# Patient Record
Sex: FEMALE | Race: BLACK OR AFRICAN AMERICAN | Employment: OTHER | URBAN - METROPOLITAN AREA
[De-identification: names, ages, dates, MRNs, and addresses within clinical notes are randomized per-mention and may not be internally consistent; named-entity substitution may affect disease eponyms.]

---

## 2018-11-17 PROBLEM — I47.9 PAROXYSMAL TACHYCARDIA (HCC): Status: ACTIVE | Noted: 2018-11-17

## 2018-11-17 PROBLEM — R60.0 EDEMA OF BOTH LEGS: Status: ACTIVE | Noted: 2018-11-17

## 2018-11-17 PROBLEM — M17.10 PRIMARY OSTEOARTHRITIS OF KNEE: Status: ACTIVE | Noted: 2018-11-17

## 2021-03-04 PROBLEM — R06.09 DYSPNEA ON EXERTION: Status: ACTIVE | Noted: 2021-03-04

## 2022-11-07 NOTE — PROGRESS NOTES
Provided condition update for Dr. Delon Stokes TORB pelvic ultrasound for PMB, results to be sent to Dr. Arsh Amato. Called pt after appointment and notified of new orders. She verbalizes understanding and agreeable to plan.  Provided central scheduling phone #

## 2022-11-07 NOTE — PATIENT INSTRUCTIONS
[facility] UROGYNECOLOGY  Tamiarush Morgan [address]  [place] [address] [address]: [phone]  FAX: [phone]       Urodynamic Testing Discharge Instructions: There are NO dietary or activity restrictions. You may resume your normal schedule. You may have mild discomfort for a few hours after your testing today. There may be some mild burning when you urinate or you may see some blood in your urine. These problems should not last more than 24 hours. The following suggestions may minimize any symptoms you experience. Drink 6-8 large glasses of water over the next 8 hours  A compress or sitz bath may be soothing  Tylenol or Ibuprofen may be taken as needed    If you experience any of the following, please call the office or, if after hours, the on-call physician at [phone]. Excessive pain  Bright red bloody discharge  Fever or chills  Continued urgency, frequency or burning with urination    Obtaining Test Results    Your urodynamic test will be interpreted by a specialist and available to the referring physician within 7-14 days. Patients in our clinic are given an appointment to come back to discuss the results and any appropriate treatment recommendations. Please do not hesitate to contact our office with any questions or concerns at [phone]. I acknowledge that I have received verbal and written discharge  instructions and that I understand these instructions clearly.     Patient Signature:    Date:

## 2022-11-07 NOTE — PROCEDURES
Patient here for urodynamic testing. Procedure explained and confirmed by patient. See evaluation form for results. Both verbal and written discharge instructions were given. Patient tolerated procedure well and will follow up with Dr. Javad Valdes on 22 per phone. URODYNAMIC EVALUATION    PATIENT HISTORY:    Prolapse:  Yes  ANAM:  No  UUI:  Yes, daily  Nocturia:  2  Frequency:  every 2-3 hours  Incomplete Emptying:  Yes, perceived at times  Constipation:  No  Last void prior to UDS testinmin  Current urge to void? Moderate  OAB meds stopped prior to test?  NA  Other symptoms? Pt reports 3 episodes of  vaginal spotting since last visit in 2022, pt unsure if coming from prolapse. She is postmenopausal.      Surgery? [x]  No, interested  []  Yes, specify date:      PATIENT DIAGNOSIS:  Urge Incontinence N39.41,  Uterovaginal Prolapse N81.2 (incomplete)    UDS FINDINGS:   Stress Incontinence N39.3, Uterovaginal Prolapse N81.2 (incomplete) and Detrusor Instability N31.9      MEDICATION: vitamin E 100 UNITS Oral Cap, Take 100 Units by mouth in the morning., Disp: , Rfl:   cholecalciferol 25 MCG (1000 UT) Oral Tab, Take 1,000 Units by mouth in the morning., Disp: , Rfl:   Calcium Carbonate (CALCIUM 600) 600 MG Oral Tab, Take 600 mg by mouth., Disp: , Rfl:   FUROSEMIDE 20 MG Oral Tab, TAKE 2 TABLETS BY MOUTH EVERY MORNING AND 1 TAB EVERY AFTERNOON, Disp: 270 tablet, Rfl: 0  Potassium Chloride ER 20 MEQ Oral Tab CR, Take 20 mEq by mouth daily. , Disp: 90 tablet, Rfl: 0  metoprolol succinate ER 50 MG Oral Tablet 24 Hr, Take 1 tablet (50 mg total) by mouth daily.  (Patient taking differently: Take 100 mg by mouth in the morning.), Disp: 180 tablet, Rfl: 0  OMEPRAZOLE 40 MG Oral Capsule Delayed Release, TAKE 1 CAPSULE BY MOUTH EVERY DAY, Disp: 90 capsule, Rfl: 0  ferrous sulfate 325 (65 FE) MG Oral Tab EC, Take 325 mg by mouth daily with breakfast., Disp: , Rfl:          ALLERGIES:  Patient has no known allergies. EXAM:  Urinalysis Dip:  Today's Results   Component Date Value    control run 11/07/2022 Yes     Blood Urine 11/07/2022 Negative     Nitrite Urine 11/07/2022 Negative     Leukocyte esterase urine 11/07/2022 Trace       Urovesico Junction ( >30 degrees ):  [x]  Mobile  []  Fixed    Perineal Sensation:  [x]  Normal  []  Abnormal    Additional Notes:    PROLAPSE:  [x]  Yes  []  No  Prolapse reduced for testing? [x]  Yes  []  No  []  Pessary  [x]  Speculum  []  Proctoswab  []  Vag Packing    Additional Notes:    UROFLOWMETRY: UNREDUCED    Voided Volume:                        74     mL  Maximum Flow Rate:                             19   mL/sec  Average flow rate:                         7    mL/sec  Post-void Residual:                        5   mL  Pattern:  [x]  Normal  []  Poor flow     []  Intermittent  []  Other  Void:   [x]  Typical  []  Atypical    Additional Notes:    CYSTOMETRY:  Urethral Catheter:  Fr 7 / tdoc  Abd Catheter:     Fr 7 / tdoc   Infusion:  Water Rate 30 mL/min reduced to 10mL/min with onset of DO  Temp:  Room  Position:  [x]  Sit  []  Stand  []  Supine  First sensation:   107 mL  First desire to void:   180 mL  Strong desire to void:  257 mL  Maximum cystometric capacity:   292 mL  Detrusor Activity:  [x]  Unstable   []  Stable  Urge leakage? []  Yes [x]  No  Volume at 1st unhibited detrusor cont:   248 mL  Detrusor instability provoked by:    []  Spontaneous []  Coughing  [x]  Filling  []  Valsalva  []  Other    Additional Notes:      URETHRAL FUNCTION:  Valsava (vesical) Leak Point Pressures:    Volume Leak Point Pressure Leak? Cough Valsalva      100mL 188  98   cm H2O []  Yes [x]  No         REDUCED:1/2 SPECULUM  Volume Leak Point Pressure Leak? Cough Valsalva      100mL 196  82   cm H2O []  Yes [x]  No   200mL 213   77 cm H2O []  Yes [x]  No   258mL 194  89   cm H2O [x]  Yes []  No       Genuine Stress Incontinence demonstrated?    [x]  Yes @ 258 mL, reduced in absence of DO []  No    Resting Urethral Pressure Profile:     Functional Urethral Length:    0.7      cm         0.6        cm     Maximum UCP:          56 cm          46   cm       PRESSURE/FLOW STUDY: UNREDUCED  Voided volume:   305     mL  Maximum flow rate:       23 mL/sec  Pressure Detrusor (at maximum flow):         16   cm H2O  Post void residual:      16         mL  Voiding mechanism:  [x]  Abnormal  []  Normal  []  Strain to void   []  Weak detrusor  Void:   [x]  Typical   []  Atypical    Additional Notes: Intermittent rise in PDET for a prolonged void. Pt reports this is typical void for her at home-reports intermittent voiding pattern.   EMG:  [x]  Reactive []  Non-Reactive    11/7/2022 12:39 PM     PERFORMED BY:  Madelin Puga RN        URODYNAMIC PHYSICIAN INTERPRETATION    IMPRESSION:   74/5cc & 305/16cc  MCFP 292cc  DO @ 248cc  ANAM @ 258 mL, reduced in absence of DO       Post-Procedure Diagnoses: Detrusor instability [N31.9] and Stress urinary incontinence [N39.3]    Comments:      Shayla Lange DO   11/7/2022   3:02 PM

## 2023-02-01 ENCOUNTER — EKG ENCOUNTER (OUTPATIENT)
Dept: LAB | Age: 73
End: 2023-02-01
Attending: INTERNAL MEDICINE
Payer: MEDICARE

## 2023-02-01 ENCOUNTER — LAB ENCOUNTER (OUTPATIENT)
Dept: LAB | Age: 73
End: 2023-02-01
Attending: INTERNAL MEDICINE
Payer: MEDICARE

## 2023-02-01 DIAGNOSIS — Z01.818 PRE-OP EVALUATION: ICD-10-CM

## 2023-02-01 LAB
ANION GAP SERPL CALC-SCNC: 7 MMOL/L (ref 0–18)
ATRIAL RATE: 86 BPM
BASOPHILS # BLD AUTO: 0.09 X10(3) UL (ref 0–0.2)
BASOPHILS NFR BLD AUTO: 1 %
BUN BLD-MCNC: 9 MG/DL (ref 7–18)
CALCIUM BLD-MCNC: 9 MG/DL (ref 8.5–10.1)
CHLORIDE SERPL-SCNC: 98 MMOL/L (ref 98–112)
CO2 SERPL-SCNC: 34 MMOL/L (ref 21–32)
CREAT BLD-MCNC: 0.82 MG/DL
EOSINOPHIL # BLD AUTO: 0.08 X10(3) UL (ref 0–0.7)
EOSINOPHIL NFR BLD AUTO: 0.9 %
ERYTHROCYTE [DISTWIDTH] IN BLOOD BY AUTOMATED COUNT: 14.3 %
FASTING STATUS PATIENT QL REPORTED: YES
GFR SERPLBLD BASED ON 1.73 SQ M-ARVRAT: 76 ML/MIN/1.73M2 (ref 60–?)
GLUCOSE BLD-MCNC: 129 MG/DL (ref 70–99)
HCT VFR BLD AUTO: 44.7 %
HGB BLD-MCNC: 14.5 G/DL
IMM GRANULOCYTES # BLD AUTO: 0.03 X10(3) UL (ref 0–1)
IMM GRANULOCYTES NFR BLD: 0.3 %
LYMPHOCYTES # BLD AUTO: 1.82 X10(3) UL (ref 1–4)
LYMPHOCYTES NFR BLD AUTO: 21.2 %
MCH RBC QN AUTO: 30.1 PG (ref 26–34)
MCHC RBC AUTO-ENTMCNC: 32.4 G/DL (ref 31–37)
MCV RBC AUTO: 92.7 FL
MONOCYTES # BLD AUTO: 0.75 X10(3) UL (ref 0.1–1)
MONOCYTES NFR BLD AUTO: 8.7 %
NEUTROPHILS # BLD AUTO: 5.83 X10 (3) UL (ref 1.5–7.7)
NEUTROPHILS # BLD AUTO: 5.83 X10(3) UL (ref 1.5–7.7)
NEUTROPHILS NFR BLD AUTO: 67.9 %
OSMOLALITY SERPL CALC.SUM OF ELEC: 288 MOSM/KG (ref 275–295)
P AXIS: 14 DEGREES
P-R INTERVAL: 124 MS
PLATELET # BLD AUTO: 198 10(3)UL (ref 150–450)
POTASSIUM SERPL-SCNC: 3 MMOL/L (ref 3.5–5.1)
Q-T INTERVAL: 372 MS
QRS DURATION: 88 MS
QTC CALCULATION (BEZET): 445 MS
R AXIS: 82 DEGREES
RBC # BLD AUTO: 4.82 X10(6)UL
SODIUM SERPL-SCNC: 139 MMOL/L (ref 136–145)
T AXIS: 52 DEGREES
VENTRICULAR RATE: 86 BPM
WBC # BLD AUTO: 8.6 X10(3) UL (ref 4–11)

## 2023-02-01 PROCEDURE — 80048 BASIC METABOLIC PNL TOTAL CA: CPT

## 2023-02-01 PROCEDURE — 93005 ELECTROCARDIOGRAM TRACING: CPT

## 2023-02-01 PROCEDURE — 93010 ELECTROCARDIOGRAM REPORT: CPT | Performed by: INTERNAL MEDICINE

## 2023-02-01 PROCEDURE — 85025 COMPLETE CBC W/AUTO DIFF WBC: CPT

## 2023-02-01 PROCEDURE — 36415 COLL VENOUS BLD VENIPUNCTURE: CPT

## 2023-02-10 NOTE — PAT NURSING NOTE
Called ,pcp office and left message for  answering service requesting doctor call regarding medical clearance for surgery

## 2023-02-10 NOTE — PAT NURSING NOTE
Kofi Stinson returned my call and I advised of need for medical clearance. She stated she will review and will send a note later today

## 2023-02-13 NOTE — BRIEF OP NOTE
Pre-Operative Diagnosis: UTEROVAGINAL PROLAPSE, STRESS URINARY INCONTINENCE     Post-Operative Diagnosis: UTEROVAGINAL PROLAPSE, STRESS URINARY INCONTINENCE, fibroid uterus      Procedure Performed:   TOTAL VAGINAL HYSTERECTOMY WITH BILATERAL SALPINGECTOMY (NEREYDA)UTEROSACRAL VAGINAL VAULT SUSPENSION, ANTERIOR/POSTERIOR/ENTEROCELE REPAIR, PLACEMENT OF MID-URETHRAL SLING, CYSTOSCOPY (Vince Vogt)    Surgeon(s) and Role:  Panel 1:     * Axel Aguilar MD - Primary  Panel 2:     * Isael Paulino DO - Primary    Assistant(s):        Surgical Findings: see dictated note.       Specimen: uterus, bilat fallopian tubes     Estimated Blood Loss: Blood Output: 100 mL (2/13/2023  2:26 PM)      Dictation Number:      Shelia Corral MD  2/13/2023  2:58 PM

## 2023-02-13 NOTE — H&P
66 y/o female with uterovaginal prolapse and stress urinary incontinence for surgical mgmt  Wants surgery  Bowel dysfunction  May want home health  Doesn't want vaginal mesh    Pre operative clearance with Dr Janes Phoenix, pt seen & examined without changes. Thorough discussion of surgical risks, benefits, and alternatives including, but not limited to bleeding/clots, infection, injury to nearby organs (urethra, bladder, ureters, bowel, blood vessels), mesh erosion, dyspareunia, de paola UI, worsening UI, recurrence, voiding dysfunction, and pain. Discussed pain mgmt and potential need for narcotics. Discussed addiction potential with narcotics. IL  reviewed. Plan to proceed with [facility] poss BSO (Dr Alem Skinner), [facility] APE repair, MUS, cysto (+cath) as consented  All questions answered.    Marcelo Moss  [phone]

## 2023-02-13 NOTE — OPERATIVE REPORT
[address]    PATIENT'S NAME: JANETH Davis   ATTENDING PHYSICIAN: Blanca Brooks M.D. OPERATING PHYSICIAN: Blanca Brooks M.D. PATIENT ACCOUNT#:   [de-identified]    LOCATION:  [facility] OR [facility] 10  MEDICAL RECORD #:   [id number]       YOB: 1950  ADMISSION DATE:       02/13/2023      OPERATION DATE:  02/13/2023    OPERATIVE REPORT      PREOPERATIVE DIAGNOSIS:  Uterovaginal prolapse and urinary stress incontinence. POSTOPERATIVE DIAGNOSIS:  Uterovaginal prolapse and urinary stress incontinence. PROCEDURE:  Total laparoscopic hysterectomy and bilateral salpingectomy performed by myself with Dr. Ladonna Martinez as an assistant; uterosacral suspension, anterior-posterior repair, cystoscopy and TVT performed by Dr. Yvette Rosenberg with myself as an assistant. ANESTHESIA:  General endotracheal.    ESTIMATED BLOOD LOSS:  100 mL. DRAINS:  Huizar to gravity. COMPLICATIONS:  None. DISPOSITION:  Patient taken to the recovery room in good condition. FINDINGS:  Retroverted uterus. Right-sided broad ligament myoma. Normal bilateral fallopian tubes. Normal ovaries but very high in the pelvic cavity. OPERATIVE TECHNIQUE:  After assuring informed consent, the patient was taken to the operating room, given general anesthesia, placed in dorsal lithotomy position, prepped and draped in usual sterile fashion. A weighted speculum was placed in the posterior vagina, and the cervix was visualized, grasped with 2 single-tooth tenacula, and cervix was brought through the introitus. The vaginal epithelium was infiltrated with Marcaine with epinephrine in a circumferential fashion around the entire cervix, and the vaginal epithelium was circumferentially incised around the cervix. Posterior peritoneum was identified and entered with Kim scissors. Posterior retractor was placed inside the pelvis.   The uterosacral ligaments were identified bilaterally, clamped with a curved Cholo clamp, cut and transected and suture ligated with 0 Vicryl. Cardinal ligaments bilaterally were clamped with curved Heaneys, resected and suture ligated. The anterior peritoneum was dissected away. The bladder pillars and uterine arteries and vein were clamped with curved Cholo and transected. Peritoneum was then entered fully. The broad ligament was clamped and cut and suture ligated. The utero-ovarian ligaments were clamped, cut, and suture ligated bilaterally. Uterus was removed from the operative field. The fallopian tubes bilaterally were inspected and noted to be normal.  The ovaries were also noted to be normal but located flush against the pelvic sidewall up high in the pelvis. A decision was made because they were completely normal and it would be difficult with increased risk of injury to try to remove them that we would leave them in place. The fallopian tubes, however, were accessible. They were grabbed with a Lillette Lolling, brought onto the operative field, clamped with a curved 6, resected out with Metzenbaum. The pedicles were tied with a free tie of 0 Vicryl. This procedure was completed on both sides without complication. This then ended my part of the operative procedure. Dr. Rosendo Milner will finish dictating the remainder of the case.     Dictated By Verner Edu, M.D.  d: 02/13/2023 15:06:39  t: 02/13/2023 16:42:11  [facility] [id number]  JOSEPH/

## 2023-02-13 NOTE — DISCHARGE INSTRUCTIONS
[facility] FOR PELVIC MEDICINE     Post-Operative Guidelines      AVOID CONSTIPATION - Take Miralax: one capful in water or juice each morning. You can also take each evening if needed. Use milk of magnesia daily as needed to avoid straining with BMs  No lifting over 10 lbs. (1 gallon of milk is 8 lbs. )  It is okay to walk up and down stairs and to walk outside, but be careful not to become fatigued. Showers and tub baths are okay, even if you have a catheter or abdominal incision. You may ride in a car immediately. You may drive after 1-2 weeks. Please call us for any of the following:  Temperature above 100.5 for 4 hours or above 101.0 at any time  Chest pain or trouble breathing  Vaginal bleeding heavier than a period  Redness, tenderness or swelling of your legs  Pain or burning when you urinate  Redness, pain or foul discharge from incision    Office telephone, [phone], after hours [phone]    Sometimes managing your health at home requires assistance. The [facility] team has recognized your preference to use [facility]. They can be reached by phone at [phone]. The fax number for your reference is [phone]. A representative from the home health agency will contact you or your family to schedule your first visit.

## 2023-02-13 NOTE — ANESTHESIA PROCEDURE NOTES
Airway  Date/Time: 2/13/2023 1:23 PM  Urgency: elective      General Information and Staff    Patient location during procedure: OR  Anesthesiologist: Caridad Vargas MD  Performed: anesthesiologist     Indications and Patient Condition  Indications for airway management: anesthesia  Sedation level: deep  Preoxygenated: yes  Patient position: sniffing  Mask difficulty assessment: 1 - vent by mask    Final Airway Details  Final airway type: endotracheal airway      Successful airway: ETT  Cuffed: yes   Successful intubation technique: direct laryngoscopy  Facilitating devices/methods: intubating stylet  Endotracheal tube insertion site: oral  Blade: Sean  Blade size: #3  ETT size (mm): 7.0    Cormack-Lehane Classification: grade I - full view of glottis  Placement verified by: chest auscultation and capnometry   Cuff volume (mL): 7  Measured from: lips  ETT to lips (cm): 21  Number of attempts at approach: 1  Ventilation between attempts: none  Number of other approaches attempted: 0

## 2023-02-13 NOTE — OPERATIVE REPORT
PRE-OP DIAGNOSIS:  Uterovaginal prolapse; stress incontinence    POST-OP DIAGNOSIS:  Same, fibroid uterus    PROCEDURE:  Repair of enterocele; uterosacral ligament suspension; anterior colporrhaphy; posterior colporrhaphy; midurethral sling; cystourethroscopy    SURGEON:  Ladonna Martinez DO    ASSISTANT:  Pedro Liang    ANESTHESIA:  General  No specimen for this portion    EBL:  100 ml  UOP: 200 mL    Findings: bilateral ureteral efflux without evidence of bladder, ureteral, or urethral injury. Hemostasis. DESCRIPTION OF PROCEDURE:   The patient remained in dorsal lithotomy position prepped and draped under general anesthesia after Dr. Pedro Liang completed the vaginal hysterectomy. Due to the presence of significant apical prolapse and associated enterocele, it was necessary to proceed with suspension of the vaginal apex and enterocele repair. A lap sponge was placed into the peritoneal cavity and the uterosacral ligaments identified. A Cowan stitch was placed and tagged to repair the enterocele. This suture was placed through the posterior vaginal wall, through the left uterosacral ligament, across the anterior rectum, through the right uterosacral ligament and then back through the posterior vaginal wall. Two uterosacral suspension stitches were then placed on each side of the pelvis. These were placed high on the uterosacral ligaments at and just proximal to the ischial spine. Uterosacral suspension stitches were then placed using 1-0 vicryl sutures on each side of the pelvis for apical suspension. Once these sutures were placed, the urethra was dilated to accommodate a 23 Angu[place]an caliber cystoscope sheath and cystoscopy was undertaken. Cystoscopy confirmed that there had been no injury to the bladder. Urine was seen from both ureteral orifices confirming that the ureters were patent.      Allis clamps were used to grasp the redundant anterior vaginal epithelium in the midline and a midline incision was made after infiltration of .25% marcaine with epinepherine. The redundant epithelium was dissected from the underlying endopelvic connective tissue of the bladder and 0 Vicryl suture was then used to plicate the endopelvic connective tissue, obliterating the cystocele. The excess vaginal epithelium was excised and the midline incision was then closed with 2-0 Vicryl suture in running locking fashion. The lap sponge was removed from the peritoneal cavity and the vaginal apex was then closed with 2-0 Vicryl suture in a running locking fashion. The uterosacral sutures were then tied down resulting in satisfactory elevation of the vaginal apex. Allis clamps were then used to grasp the vaginal epithelium suburethrally and a scalpel was used to make the midline incision after infiltration of .25% marcaine with epinepherine. Sharp dissection with Metzenbaum scissors was performed to dissect the periurethral tissues towards the pubic rami bilaterally. The bladder was emptied, the Advantage Fit trocars were then used to place the guide sleeves into the retropubic space. Once the sleeves were in position, cystoscopy was undertaken. No bladder, urethral, or ureteral injuries identified. Urine seen from both UOs. The sleeves were then brought through the suprapubic skin, bringing the mesh into position at the level of the midurethra. Care was taken to avoid placing tension on the urethra as the covering sheath was removed from the mesh. The excess mesh was cut away at the suprapubic skin and those incisions closed with skin glue. The vaginal incision was then closed with 2-0 Vicryl suture in a running locking fashion. Attention was then directed to the posterior compartment. The redundant posterior vaginal epithelium and perineal skin was excised after infiltration of .25% marcaine with epinepherine. 0 Vicryl suture was then used to plicate the rectovaginal connective tissue, obliterating the rectocele.   Additional sutures of 0 Vicryl were utilized to reconstruct the perineal body. Rectal examination confirmed that none of these sutures had impinged the rectum. 2-0 Vicryl sutures were then used to reapproximate the vaginal epithelium in the midline in a running locking fashion. Inspection at this point revealed a well-supported vaginal apex, anterior, and posterior compartments, with good hemostasis. A Huizar catheter was placed transurethrally. The patient was then removed from the dorsal lithotomy position, awakened and extubated and transferred from the operating room to the recovery room in stable condition, having tolerated the procedure well. Sponge, lap, & needle counts were correct.

## 2023-02-14 NOTE — PLAN OF CARE
Patients IV d/c'd, catheter intact. Huizar care and leg bag teaching completed. Patient verbalized understanding. Supplies given. All discharge instructions explained, all questions answered. Patient will be discharged via wheelchair with support staff.

## 2023-02-14 NOTE — CM/SW NOTE
02/14/23 1200   Discharge disposition   Expected discharge disposition [facility],[address] Floor Provider [facility]

## 2023-02-14 NOTE — CM/SW NOTE
02/14/23 1200   Choice of Post-Acute Provider   Informed patient of right to choose their preferred provider Yes   List of appropriate post-acute services provided to patient/family with quality data Yes   Patient/family choice Benita Keith   Information given to Patient

## 2023-02-14 NOTE — PLAN OF CARE
Assumed care at [address]. Alert and oriented x4. Pain controlled with Tramadol and Toradol. Fall precautions maintained per protocol. Call light in reach at bedside.       Problem: Patient/Family Goals  Goal: Patient/Family Long Term Goal  Description: Patient's Long Term Goal: Home    Interventions:  - Pain management  - See additional Care Plan goals for specific interventions  Outcome: Progressing  Goal: Patient/Family Short Term Goal  Description: Patient's Short Term Goal: Pain    Interventions:   - Medication  - See additional Care Plan goals for specific interventions  Outcome: Progressing

## 2023-02-14 NOTE — CM/SW NOTE
02/14/23 1000   CM/SW Referral Data   Referral Source Physician   Reason for Referral Discharge planning   Informant Patient;Clinical Staff Member;EMR   Patient Info   Patient's Current Mental Status at Time of Assessment Alert;Oriented   Patient's [address]   Patient lives with Alone; Other   Patient Status Prior to Admission   Independent with ADLs and Mobility Yes   Discharge Needs   Anticipated D/C needs Home health care       Sw met with pt to discuss dc planning. Pt is 68 yo female, s/p  repair of enterocele; uterosacral ligament suspension; anterior colporrhaphy; posterior colporrhaphy; midurethral sling; cystourethroscopy. Pt will dc home with domínguez. She lives alone. Discussed HHC RN at [facility] for domínguez care teaching and she agreed. Srini Morales referral in [place]- await responses. Will follow up with [facility] list and choice once responses received.     ANDREA SnyderW  /Discharge Planner

## 2023-02-14 NOTE — PROGRESS NOTES
NURSING ADMISSION NOTE      Patient admitted via Cart  Oriented to room. Safety precautions initiated. Bed in low position. Call light in reach. Alert and oriented. 4L nasal cannula.  and tele. Saline locked. Regular diet. Peripad and mesh underwear in place.

## 2023-02-14 NOTE — PLAN OF CARE
Patient ambulating in room. VSS. Low urine output, bolus given x2. Patient tolerating diet. Denies flatus. Pain managed with medications as ordered - see MAR. POC discussed, all questions and concerns addressed. All safety measures in place. Will monitor.        Problem: Patient/Family Goals  Goal: Patient/Family Long Term Goal  Description: Patient's Long Term Goal: discharge    Interventions:  - advance diet  - See additional Care Plan goals for specific interventions  Outcome: Progressing  Goal: Patient/Family Short Term Goal  Description: Patient's Short Term Goal: pain control    Interventions:   - medications  - See additional Care Plan goals for specific interventions  Outcome: Progressing     Problem: PAIN - ADULT  Goal: Verbalizes/displays adequate comfort level or patient's stated pain goal  Description: INTERVENTIONS:  - Encourage pt to monitor pain and request assistance  - Assess pain using appropriate pain scale  - Administer analgesics based on type and severity of pain and evaluate response  - Implement non-pharmacological measures as appropriate and evaluate response  - Consider cultural and social influences on pain and pain management  - Manage/alleviate anxiety  - Utilize distraction and/or relaxation techniques  - Monitor for opioid side effects  - Notify MD/LIP if interventions unsuccessful or patient reports new pain  - Anticipate increased pain with activity and pre-medicate as appropriate  Outcome: Progressing     Problem: RISK FOR INFECTION - ADULT  Goal: Absence of fever/infection during anticipated neutropenic period  Description: INTERVENTIONS  - Monitor WBC  - Administer growth factors as ordered  - Implement neutropenic guidelines  Outcome: Progressing     Problem: DISCHARGE PLANNING  Goal: Discharge to home or other facility with appropriate resources  Description: INTERVENTIONS:  - Identify barriers to discharge w/pt and caregiver  - Include patient/family/discharge partner in discharge planning  - Arrange for needed discharge resources and transportation as appropriate  - Identify discharge learning needs (meds, wound care, etc)  - Arrange for interpreters to assist at discharge as needed  - Consider post-discharge preferences of patient/family/discharge partner  - Complete POLST form as appropriate  - Assess patient's ability to be responsible for managing their own health  - Refer to Case Management Department for coordinating discharge planning if the patient needs post-hospital services based on physician/LIP order or complex needs related to functional status, cognitive ability or social support system  Outcome: Progressing

## 2023-02-15 NOTE — TELEPHONE ENCOUNTER
TC to pt following surgery  Doing well, no complaints  Denies CP or SOB  Pain controlled with PO meds (IBP, tylenol, tramadol)  Huizar catheter in place, draining freely to gravity  Had loose BM, this am, taking MOM   No heavy vaginal bleeding, some spotting  Tolerates ambulation, PO diet  Reviewed bowel mgmt and activity restrictions  All questions answered  Encouraged her to call with questions or concerns  Follow up as scheduled, needs to schedule voiding trial for Monday and two week post op appointment with Dr. Lizz Appiah  She understands and agrees to plan

## 2023-02-20 NOTE — PROGRESS NOTES
14:00 spoke with patient, states she is voiding well and able to empty her bladder. Reviewed signs and symptoms of UTI and urinary retention. Reviewed bowel regimen and postoperative activity. Patient verbalized understanding, all questions were answered.

## 2023-02-20 NOTE — PATIENT INSTRUCTIONS
Voiding Trial Instructions  You have passed your voiding trial at 8:45am.  Please make sure you are drinking some water today. You can take your Motrin to help with any swelling from the catheter. It is important to try and empty your bladder every two hours during the day. Try and empty again at 10:45am  If you are unable to empty, try and drink a glass of water and try again 30-60 minutes later. If you have been unable to empty your bladder by 1:00pm, which is 4 hours after leaving the office, you will most likely be uncomfortable and you will need to come back into the office. If it is after 4pm, go to an urgent care or emergency room to have a catheter placed again. Please call our office at [phone] if you have any questions or concerns.

## 2023-03-21 NOTE — TELEPHONE ENCOUNTER
Incoming telephone call received from the patient. Patient calling our office with a condition update. Patient reports she was admitted to [facility] on 03/17/2023 via     ambulance as she was having trouble breathing. She was admitted and diagnosed with bilateral     pulmonary embolisms. She remains in the hospital and wanted to update the office of the above. Patient reports she has an appointment scheduled in 06/2023. Informed the patient to call the office with questions or concerns. To keep upcoming appointment as scheduled    Patient verbalized understanding. Encouraged to call with questions or concerns.

## (undated) NOTE — LETTER
Patient Name: Rosita Redding  Surgery Date: 2/13/2023  CSN: [id number]  Medical Record: [id number]     Surgeon(s):  Roseanne Canavan, MD Oneita Plum, DO  Consent Procedure: TOTAL VAGINAL HYSTERECTOMY WITH BILATERAL SALPINGO-OOPHORECTOMY (NEREYDA)UTEROSACRAL VAGINAL VAULT SUSPENSION, ANTERIOR/POSTERIOR/ENTEROCELE REPAIR, POSSIBLE PLACEMENT OF MID-URETHRAL SLING  Anesthesia Type: General    To Attending: Roseanne Canavan, MD Fax #: [phone]  To PCP:  Noah Jama  Fax #: [phone]      ANESTHESIOLOGIST Dr.martin Su  HAS REQUESTED THE FOLLOWING:  NOTE OF MEDICAL CLEARANCE  _____________________________________________________  Please note the following attached testing results for your review:   EKG

## (undated) NOTE — Clinical Note
Stephany Bowers - I saw Vince Pulling today with bulge. I've recommended bladder testing. I will work to manage her sx. We can coordinate surgery. I appreciate the opportunity to participate in her care.  Thanks, Moira Ramirez

## (undated) NOTE — LETTER
Patient Name: Ian Duvall CSN: [id number]  -Age / Sex: 1950-A: 67 y  female Medical Records: [id number]    ABNORMAL VALUES  Surgeon(s):  MD Bree Alcantar DO  Anesthesia Type: General  Procedure Description: TOTAL VAGINAL HYSTERECTOMY WITH BILATERAL SALPINGO-OOPHORECTOMY (NEREYDA)UTEROSACRAL VAGINAL VAULT SUSPENSION, ANTERIOR/POSTERIOR/ENTEROCELE REPAIR, POSSIBLE PLACEMENT OF MID-URETHRAL SLING  Primary Surgeon:  Itzel Akers MD  Phone Number: [phone]    PLEASE NOTE THE FOLLOWING ABNORMALITIES:   Chemistry  K+ 3.0  ________________________________________________________  Anesthesia to review patient's chart